# Patient Record
Sex: FEMALE | Race: OTHER | HISPANIC OR LATINO | URBAN - METROPOLITAN AREA
[De-identification: names, ages, dates, MRNs, and addresses within clinical notes are randomized per-mention and may not be internally consistent; named-entity substitution may affect disease eponyms.]

---

## 2018-09-19 ENCOUNTER — EMERGENCY (EMERGENCY)
Facility: HOSPITAL | Age: 38
LOS: 1 days | Discharge: HOME | End: 2018-09-19
Admitting: EMERGENCY MEDICINE
Payer: SELF-PAY

## 2018-09-19 VITALS
SYSTOLIC BLOOD PRESSURE: 122 MMHG | DIASTOLIC BLOOD PRESSURE: 68 MMHG | WEIGHT: 128.97 LBS | OXYGEN SATURATION: 99 % | TEMPERATURE: 98 F | HEART RATE: 55 BPM | RESPIRATION RATE: 18 BRPM

## 2018-09-19 DIAGNOSIS — H57.8 OTHER SPECIFIED DISORDERS OF EYE AND ADNEXA: ICD-10-CM

## 2018-09-19 DIAGNOSIS — H11.32 CONJUNCTIVAL HEMORRHAGE, LEFT EYE: ICD-10-CM

## 2018-09-19 DIAGNOSIS — R51 HEADACHE: ICD-10-CM

## 2018-09-19 PROCEDURE — 99283 EMERGENCY DEPT VISIT LOW MDM: CPT

## 2018-09-19 PROCEDURE — 99282 EMERGENCY DEPT VISIT SF MDM: CPT

## 2018-09-19 RX ORDER — ACETAMINOPHEN 500 MG
650 TABLET ORAL ONCE
Qty: 0 | Refills: 0 | Status: COMPLETED | OUTPATIENT
Start: 2018-09-19 | End: 2018-09-19

## 2018-09-19 RX ADMIN — Medication 650 MILLIGRAM(S): at 18:46

## 2018-09-19 RX ADMIN — Medication 1 DROP(S): at 18:47

## 2018-09-19 NOTE — ED ADULT NURSE NOTE - NSIMPLEMENTINTERV_GEN_ALL_ED
Implemented All Universal Safety Interventions:  [place] to call system. Call bell, personal items and telephone within reach. Instruct patient to call for assistance. Room bathroom lighting operational. Non-slip footwear when patient is off stretcher. Physically safe environment: no spills, clutter or unnecessary equipment. Stretcher in lowest position, wheels locked, appropriate side rails in place.

## 2018-09-19 NOTE — ED PROVIDER NOTE - EYES, MLM
Bilaterally, pupils equal, round and reactive to light. Left with erythema located on medial sclera, no hyphema. pressure 10mmHg

## 2018-09-19 NOTE — ED PROVIDER NOTE - MEDICAL DECISION MAKING DETAILS
phone used to communicate with pt. pt with erythema located on sclera. no hyphema, eom intact. pressure normal .neuro intact. advised tylenol for HA, artificial tears and advised fu with optha. any concerning sxs to return to ed.

## 2018-09-19 NOTE — ED ADULT NURSE NOTE - OBJECTIVE STATEMENT
c.o left eye redness and pain since tuesday. denies any injuries, visual changes. c.o left side headache. denies any nausea, chest pain, sob. steady gait noted.

## 2018-09-19 NOTE — ED PROVIDER NOTE - OBJECTIVE STATEMENT
39 y/o female with no PMHx is present with left eye redness x1 week. Pt reports it occurred last week on Tuesday and has prolonged. Pt reports a discomfort located in the left eye associated with headaches. The discomfort is constant. Pt denies taking any medication to help alleviate her headache. She denies the following: difficulty walking, confusion, dizziness, blurred vision, double vision, nausea/vomiting, slurred speech, trauma, use of anti-coag/plats. Pt also denies use of contact or eye glass wear.